# Patient Record
Sex: MALE | Race: WHITE | NOT HISPANIC OR LATINO | Employment: UNEMPLOYED | URBAN - METROPOLITAN AREA
[De-identification: names, ages, dates, MRNs, and addresses within clinical notes are randomized per-mention and may not be internally consistent; named-entity substitution may affect disease eponyms.]

---

## 2017-07-16 ENCOUNTER — HOSPITAL ENCOUNTER (OUTPATIENT)
Dept: URGENT CARE | Facility: CLINIC | Age: 22
Discharge: HOME | End: 2017-07-16
Attending: FAMILY MEDICINE | Admitting: FAMILY MEDICINE

## 2019-11-02 PROCEDURE — 87081 CULTURE SCREEN ONLY: CPT | Performed by: FAMILY MEDICINE

## 2024-02-10 ENCOUNTER — HOSPITAL ENCOUNTER (EMERGENCY)
Facility: HOSPITAL | Age: 29
Discharge: HOME | End: 2024-02-10
Attending: EMERGENCY MEDICINE
Payer: MEDICAID

## 2024-02-10 VITALS
SYSTOLIC BLOOD PRESSURE: 167 MMHG | TEMPERATURE: 97.6 F | BODY MASS INDEX: 24.87 KG/M2 | HEIGHT: 75 IN | OXYGEN SATURATION: 99 % | RESPIRATION RATE: 20 BRPM | HEART RATE: 65 BPM | WEIGHT: 200 LBS | DIASTOLIC BLOOD PRESSURE: 102 MMHG

## 2024-02-10 DIAGNOSIS — K08.89 PAIN, DENTAL: Primary | ICD-10-CM

## 2024-02-10 PROCEDURE — 96372 THER/PROPH/DIAG INJ SC/IM: CPT

## 2024-02-10 PROCEDURE — 99283 EMERGENCY DEPT VISIT LOW MDM: CPT

## 2024-02-10 PROCEDURE — 25010000002 KETOROLAC TROMETHAMINE PER 15 MG: Performed by: EMERGENCY MEDICINE

## 2024-02-10 RX ORDER — AMOXICILLIN 250 MG/1
500 CAPSULE ORAL ONCE
Status: COMPLETED | OUTPATIENT
Start: 2024-02-10 | End: 2024-02-10

## 2024-02-10 RX ORDER — CYCLOBENZAPRINE HCL 10 MG
10 TABLET ORAL
Qty: 7 TABLET | Refills: 0 | Status: SHIPPED | OUTPATIENT
Start: 2024-02-10

## 2024-02-10 RX ORDER — NAPROXEN 500 MG/1
500 TABLET ORAL 2 TIMES DAILY PRN
Qty: 20 TABLET | Refills: 0 | Status: SHIPPED | OUTPATIENT
Start: 2024-02-10

## 2024-02-10 RX ORDER — KETOROLAC TROMETHAMINE 30 MG/ML
60 INJECTION, SOLUTION INTRAMUSCULAR; INTRAVENOUS ONCE
Status: COMPLETED | OUTPATIENT
Start: 2024-02-10 | End: 2024-02-10

## 2024-02-10 RX ORDER — CYCLOBENZAPRINE HCL 10 MG
10 TABLET ORAL ONCE
Status: COMPLETED | OUTPATIENT
Start: 2024-02-10 | End: 2024-02-10

## 2024-02-10 RX ADMIN — KETOROLAC TROMETHAMINE 60 MG: 30 INJECTION, SOLUTION INTRAMUSCULAR; INTRAVENOUS at 00:47

## 2024-02-10 RX ADMIN — AMOXICILLIN 500 MG: 250 CAPSULE ORAL at 00:47

## 2024-02-10 RX ADMIN — CYCLOBENZAPRINE 10 MG: 10 TABLET, FILM COATED ORAL at 00:47

## 2024-02-10 NOTE — FSED PROVIDER NOTE
Subjective   History of Present Illness    28-year-old male presents emergency department with dental pain in the right upper mouth.  He has a exposed nerve and needs a tooth pulled.  He went to the dentist today they gave him antibiotic prescription but he has not gotten it filled yet.  He was taking Motrin with only minimal relief for his pain.  He states that he is unable to sleep and the throbbing pain has been getting significantly worse.  He tried Orajel as well as ibuprofen.  The only thing that helps is swishing cold water    Review of Systems    All systems negative except as otherwise mentioned in the HPI    Past Medical History:   Diagnosis Date    Difficulty walking 06/24/2023    Causes by stress fracture    Ingrown toenail     Stress fracture 06/24/2023       Allergies   Allergen Reactions    Medrol [Methylprednisolone] Other (See Comments)      Steroids - Visual problems    Septra [Sulfamethoxazole-Trimethoprim] Other (See Comments)     Feels like on fire       Past Surgical History:   Procedure Laterality Date    CHEST SURGERY  2017    TOENAIL EXCISION         Family History   Problem Relation Age of Onset    Hodgkin's lymphoma Father        Social History     Socioeconomic History    Marital status: Single   Tobacco Use    Smoking status: Never    Smokeless tobacco: Never   Vaping Use    Vaping Use: Never used   Substance and Sexual Activity    Alcohol use: No    Drug use: No    Sexual activity: Not Currently     Partners: Female     Birth control/protection: Condom           Objective   Physical Exam    General: Alert and oriented, conversant  Eye: PERRL, EOMI, nomal conjunctiva  HENT: Normocephalic, normal hearing, moist oral mucosa    Lungs: Nonlabored respiration, no wheezing  Heart: Normal Rate, no mumurs gallops or rubs  Abdomen: Soft, Non tender, no peritoneal signs    Musculoskeletal: Normal range of motion and strength, no tenderness or swelling  Skin: Warm and dry, no alarming  josey  Neurologic: Awake, responsive, moving all extremities, no focal deficits  Psychiatric:  Cooperative, appropriate mood and affect    Dental: There is a fractured canine tooth in the upper right mandible.  There is no fluctuance or abscess present    Procedures           ED Course                                           Medical Decision Making  Risk  Prescription drug management.    28-year-old male presents emergency department with dental pain.  He has multiple dental caries.  IM Toradol given.  As well as antibiotics first dose in the emergency department.  Also provided with Flexeril.    Final diagnoses:   None       ED Disposition  ED Disposition       ED Disposition   Discharge    Condition   Stable    Comment   --               [place] [id number] [place] [id number] [phone]  In 2 days  If symptoms worsen         Medication List        New Prescriptions      cyclobenzaprine 10 MG tablet  Commonly known as: FLEXERIL  Take 1 tablet by mouth every night at bedtime.     naproxen 500 MG tablet  Commonly known as: NAPROSYN  Take 1 tablet by mouth 2 (Two) Times a Day As Needed for Moderate Pain.               Where to Get Your Medications        These medications were sent to [facility]/pharmacy #[id number] - [place], [place] - [address] [place] [address] - [phone]  - [phone] [address] [place] [address]      Hours: 24-hours Phone: [phone]   cyclobenzaprine 10 MG tablet  naproxen 500 MG tablet

## 2024-05-14 ENCOUNTER — OFFICE VISIT (OUTPATIENT)
Dept: FAMILY MEDICINE CLINIC | Facility: CLINIC | Age: 29
End: 2024-05-14
Payer: COMMERCIAL

## 2024-05-14 VITALS
RESPIRATION RATE: 18 BRPM | SYSTOLIC BLOOD PRESSURE: 158 MMHG | BODY MASS INDEX: 30.36 KG/M2 | WEIGHT: 244.2 LBS | OXYGEN SATURATION: 98 % | TEMPERATURE: 98.2 F | HEIGHT: 75 IN | HEART RATE: 90 BPM | DIASTOLIC BLOOD PRESSURE: 110 MMHG

## 2024-05-14 DIAGNOSIS — R21 RASH: ICD-10-CM

## 2024-05-14 DIAGNOSIS — H61.23 EXCESSIVE CERUMEN IN BOTH EAR CANALS: ICD-10-CM

## 2024-05-14 DIAGNOSIS — F33.0 DEPRESSION, MAJOR, RECURRENT, MILD: ICD-10-CM

## 2024-05-14 DIAGNOSIS — I10 HYPERTENSION, UNSPECIFIED TYPE: Primary | ICD-10-CM

## 2024-05-14 DIAGNOSIS — Z76.89 ENCOUNTER TO ESTABLISH CARE: ICD-10-CM

## 2024-05-14 PROBLEM — L03.011 CELLULITIS OF FINGER OF RIGHT HAND: Status: RESOLVED | Noted: 2017-07-16 | Resolved: 2024-05-14

## 2024-05-14 PROBLEM — E78.5 DYSLIPIDEMIA: Status: ACTIVE | Noted: 2019-12-16

## 2024-05-14 PROBLEM — Z83.3 FAMILY HISTORY OF DIABETES MELLITUS: Status: RESOLVED | Noted: 2024-05-14 | Resolved: 2024-05-14

## 2024-05-14 PROBLEM — L60.0 INGROWN NAIL: Status: RESOLVED | Noted: 2018-07-12 | Resolved: 2024-05-14

## 2024-05-14 PROBLEM — N62 GYNECOMASTIA, MALE: Status: ACTIVE | Noted: 2017-07-27

## 2024-05-14 PROCEDURE — 99214 OFFICE O/P EST MOD 30 MIN: CPT | Performed by: NURSE PRACTITIONER

## 2024-05-14 RX ORDER — AMLODIPINE BESYLATE 10 MG/1
10 TABLET ORAL DAILY
Qty: 90 TABLET | Refills: 0 | Status: SHIPPED | OUTPATIENT
Start: 2024-05-14

## 2024-05-14 RX ORDER — CITALOPRAM HYDROBROMIDE 10 MG/1
10 TABLET ORAL DAILY
Qty: 90 TABLET | Refills: 0 | Status: SHIPPED | OUTPATIENT
Start: 2024-05-14

## 2024-05-14 NOTE — ASSESSMENT & PLAN NOTE
Hypertension is uncontrolled  Start amlodipine 10 mg half tab x 2 to 3 days, then increase to whole tab daily.  Medication changes per orders.  Weight loss.  Regular aerobic exercise.  Ambulatory blood pressure monitoring.  Blood pressure will be reassessedin 2 weeks.

## 2024-05-14 NOTE — PROGRESS NOTES
"Chief Complaint  Establish Care    Subjective        Buddy Cho presents to [facility]  History of Present Illness    Patient presents today to establish care.  Last PCP with [facility] provider; Rachele Chicas. Works as  at [employer] in [place]. Reported history eczema.  Current affected areas include bilateral arms, right hand, left leg and right leg.  Positive for intermittent itching and dry skin.  Negative for any bleeding and drainage.      Depression:   Year of onset: 9119-6183. Frequency of symptoms: daily. Status is worsening. Level of function somewhat difficult to very difficult. Recent medication included Celexa 10 mg daily (stopped taking 4 to 5 months ago-had no refills). Risk factors include: financial stress, relationship issues (girlfriend)-no problems while on SSRI.   Aggravating factors include stress.  Relieving factors include medication. Presenting symptoms mild anxiety, depressed mood, lack of interest, and worry. Pertinent negatives include difficulty concentrating, difficulty falling asleep, difficulty staying asleep, compulsive thoughts, decreased need for sleep, easily startled, fatigue, feelings of guilty, feelings of vulnerability, increased energy, hallucinations, change in libido, loss of appetite, paranoia, poor judgement, restlessness, thoughts of death or suicide.        Objective   Vital Signs:  BP (!) 158/110 (BP Location: Left arm, Patient Position: Sitting, Cuff Size: Adult)   Pulse 90   Temp 98.2 °F (36.8 °C)   Resp 18   Ht 190.5 cm (75\")   Wt 111 kg (244 lb 3.2 oz)   SpO2 98%   BMI 30.52 kg/m²   Estimated body mass index is 30.52 kg/m² as calculated from the following:    Height as of this encounter: 190.5 cm (75\").    Weight as of this encounter: 111 kg (244 lb 3.2 oz).             Physical Exam  Constitutional:       General: He is not in acute distress.     Appearance: He is obese.      Comments: Pleasant, converses " appropriately    HENT:      Head: Normocephalic and atraumatic.      Right Ear: Tympanic membrane and external ear normal.      Left Ear: Tympanic membrane and external ear normal.      Ears:      Comments: Excessive cerumen in bilateral ear canals     Nose: Nose normal.      Mouth/Throat:      Lips: Pink.      Mouth: Mucous membranes are moist.      Pharynx: Oropharynx is clear.   Eyes:      Conjunctiva/sclera: Conjunctivae normal.   Cardiovascular:      Rate and Rhythm: Normal rate and regular rhythm.      Chest Wall: PMI is not displaced.      Pulses: Normal pulses.      Heart sounds: Normal heart sounds, S1 normal and S2 normal.   Pulmonary:      Effort: Pulmonary effort is normal.      Breath sounds: Normal breath sounds.      Comments: Negative cough  Abdominal:      General: Bowel sounds are normal.      Palpations: Abdomen is soft.      Tenderness: There is no abdominal tenderness.   Musculoskeletal:         General: Normal range of motion.      Cervical back: Neck supple.      Right lower leg: No edema.      Left lower leg: No edema.   Lymphadenopathy:      Cervical: No cervical adenopathy.      Upper Body:      Right upper body: No supraclavicular adenopathy.      Left upper body: No supraclavicular adenopathy.   Skin:     General: Skin is warm and dry.      Findings: Rash present. Rash is crusting.      Comments: Scattered mildly erythematous patches with crusting located on bilateral arms, right hand.    Neurological:      Mental Status: He is alert and oriented to person, place, and time.      Gait: Gait is intact.   Psychiatric:         Attention and Perception: Attention normal.         Mood and Affect: Mood and affect normal.         Speech: Speech normal.         Behavior: Behavior normal.         Thought Content: Thought content normal.         Judgment: Judgment normal.        Result Review :                     Assessment and Plan     Diagnoses and all orders for this visit:    1. Hypertension,  unspecified type (Primary)  Assessment & Plan:  Hypertension is uncontrolled  Start amlodipine 10 mg half tab x 2 to 3 days, then increase to whole tab daily.  Medication changes per orders.  Weight loss.  Regular aerobic exercise.  Ambulatory blood pressure monitoring.  Blood pressure will be reassessedin 2 weeks.    Orders:  -     CBC & Differential; Future  -     Comprehensive Metabolic Panel; Future  -     Hemoglobin A1c; Future  -     Urinalysis With Culture If Indicated -; Future  -     TSH Rfx On Abnormal To Free T4; Future  -     Lipid Panel; Future  -     amLODIPine (NORVASC) 10 MG tablet; Take 1 tablet by mouth Daily.  Dispense: 90 tablet; Refill: 0    2. Depression, major, recurrent, mild  Assessment & Plan:  Restart Celexa 10 mg daily.  Follow-up in 2-3 weeks with update.     Orders:  -     citalopram (CeleXA) 10 MG tablet; Take 1 tablet by mouth Daily.  Dispense: 90 tablet; Refill: 0    3. Encounter to establish care  -     CBC & Differential; Future  -     Comprehensive Metabolic Panel; Future  -     Hemoglobin A1c; Future  -     Urinalysis With Culture If Indicated -; Future  -     TSH Rfx On Abnormal To Free T4; Future  -     Lipid Panel; Future    4. Rash  -     Ambulatory Referral to Dermatology    5. Excessive cerumen in both ear canals  Comments:  Recommended Debrox x 4 days prior to return for washout.             Follow Up     Return in about 2 weeks (around 5/28/2024) for follow up hypertension, follow up labs, and ear washout. .  Patient was given instructions and counseling regarding his condition or for health maintenance advice. Please see specific information pulled into the AVS if appropriate.

## 2024-06-18 ENCOUNTER — LAB (OUTPATIENT)
Dept: FAMILY MEDICINE CLINIC | Facility: CLINIC | Age: 29
End: 2024-06-18
Payer: COMMERCIAL

## 2024-06-18 DIAGNOSIS — I10 HYPERTENSION, UNSPECIFIED TYPE: ICD-10-CM

## 2024-06-18 DIAGNOSIS — Z76.89 ENCOUNTER TO ESTABLISH CARE: ICD-10-CM

## 2024-06-18 LAB — TSH SERPL DL<=0.05 MIU/L-ACNC: 0.57 UIU/ML (ref 0.27–4.2)

## 2024-06-18 PROCEDURE — 80061 LIPID PANEL: CPT | Performed by: NURSE PRACTITIONER

## 2024-06-18 PROCEDURE — 80050 GENERAL HEALTH PANEL: CPT | Performed by: NURSE PRACTITIONER

## 2024-06-18 PROCEDURE — 81003 URINALYSIS AUTO W/O SCOPE: CPT | Performed by: NURSE PRACTITIONER

## 2024-06-18 PROCEDURE — 36415 COLL VENOUS BLD VENIPUNCTURE: CPT

## 2024-06-18 PROCEDURE — 83036 HEMOGLOBIN GLYCOSYLATED A1C: CPT | Performed by: NURSE PRACTITIONER

## 2024-06-19 LAB
ALBUMIN SERPL-MCNC: 4.7 G/DL (ref 3.5–5.2)
ALBUMIN/GLOB SERPL: 1.7 G/DL
ALP SERPL-CCNC: 84 U/L (ref 39–117)
ALT SERPL W P-5'-P-CCNC: 41 U/L (ref 1–41)
ANION GAP SERPL CALCULATED.3IONS-SCNC: 9.6 MMOL/L (ref 5–15)
AST SERPL-CCNC: 23 U/L (ref 1–40)
BASOPHILS # BLD AUTO: 0.07 10*3/MM3 (ref 0–0.2)
BASOPHILS NFR BLD AUTO: 0.8 % (ref 0–1.5)
BILIRUB SERPL-MCNC: 0.5 MG/DL (ref 0–1.2)
BILIRUB UR QL STRIP: NEGATIVE
BUN SERPL-MCNC: 8 MG/DL (ref 6–20)
BUN/CREAT SERPL: 8 (ref 7–25)
CALCIUM SPEC-SCNC: 9.4 MG/DL (ref 8.6–10.5)
CHLORIDE SERPL-SCNC: 104 MMOL/L (ref 98–107)
CHOLEST SERPL-MCNC: 162 MG/DL (ref 0–200)
CLARITY UR: CLEAR
CO2 SERPL-SCNC: 27.4 MMOL/L (ref 22–29)
COLOR UR: YELLOW
CREAT SERPL-MCNC: 1 MG/DL (ref 0.76–1.27)
DEPRECATED RDW RBC AUTO: 41.8 FL (ref 37–54)
EGFRCR SERPLBLD CKD-EPI 2021: 104.5 ML/MIN/1.73
EOSINOPHIL # BLD AUTO: 0.15 10*3/MM3 (ref 0–0.4)
EOSINOPHIL NFR BLD AUTO: 1.7 % (ref 0.3–6.2)
ERYTHROCYTE [DISTWIDTH] IN BLOOD BY AUTOMATED COUNT: 12.4 % (ref 12.3–15.4)
GLOBULIN UR ELPH-MCNC: 2.8 GM/DL
GLUCOSE SERPL-MCNC: 93 MG/DL (ref 65–99)
GLUCOSE UR STRIP-MCNC: NEGATIVE MG/DL
HBA1C MFR BLD: 5.1 % (ref 4.8–5.6)
HCT VFR BLD AUTO: 45 % (ref 37.5–51)
HDLC SERPL-MCNC: 42 MG/DL (ref 40–60)
HGB BLD-MCNC: 15.3 G/DL (ref 13–17.7)
HGB UR QL STRIP.AUTO: NEGATIVE
HOLD SPECIMEN: NORMAL
IMM GRANULOCYTES # BLD AUTO: 0.02 10*3/MM3 (ref 0–0.05)
IMM GRANULOCYTES NFR BLD AUTO: 0.2 % (ref 0–0.5)
KETONES UR QL STRIP: NEGATIVE
LDLC SERPL CALC-MCNC: 95 MG/DL (ref 0–100)
LDLC/HDLC SERPL: 2.2 {RATIO}
LEUKOCYTE ESTERASE UR QL STRIP.AUTO: NEGATIVE
LYMPHOCYTES # BLD AUTO: 1.89 10*3/MM3 (ref 0.7–3.1)
LYMPHOCYTES NFR BLD AUTO: 20.8 % (ref 19.6–45.3)
MCH RBC QN AUTO: 31.4 PG (ref 26.6–33)
MCHC RBC AUTO-ENTMCNC: 34 G/DL (ref 31.5–35.7)
MCV RBC AUTO: 92.4 FL (ref 79–97)
MONOCYTES # BLD AUTO: 0.79 10*3/MM3 (ref 0.1–0.9)
MONOCYTES NFR BLD AUTO: 8.7 % (ref 5–12)
NEUTROPHILS NFR BLD AUTO: 6.17 10*3/MM3 (ref 1.7–7)
NEUTROPHILS NFR BLD AUTO: 67.8 % (ref 42.7–76)
NITRITE UR QL STRIP: NEGATIVE
NRBC BLD AUTO-RTO: 0 /100 WBC (ref 0–0.2)
PH UR STRIP.AUTO: 6.5 [PH] (ref 5–8)
PLATELET # BLD AUTO: 317 10*3/MM3 (ref 140–450)
PMV BLD AUTO: 11.5 FL (ref 6–12)
POTASSIUM SERPL-SCNC: 4.5 MMOL/L (ref 3.5–5.2)
PROT SERPL-MCNC: 7.5 G/DL (ref 6–8.5)
PROT UR QL STRIP: NEGATIVE
RBC # BLD AUTO: 4.87 10*6/MM3 (ref 4.14–5.8)
SODIUM SERPL-SCNC: 141 MMOL/L (ref 136–145)
SP GR UR STRIP: 1.02 (ref 1–1.03)
TRIGL SERPL-MCNC: 139 MG/DL (ref 0–150)
UROBILINOGEN UR QL STRIP: NORMAL
VLDLC SERPL-MCNC: 25 MG/DL (ref 5–40)
WBC NRBC COR # BLD AUTO: 9.09 10*3/MM3 (ref 3.4–10.8)

## 2024-06-19 NOTE — PROGRESS NOTES
CALLED PATIENT AND GAVE HIM HIS LAB RESULTS AND SCHEDULED HIM TO COME IN FOR HIS NEW PATIENT F/U VISIT FOR 30 MINUTES ON 06/20/2024

## 2024-06-20 ENCOUNTER — OFFICE VISIT (OUTPATIENT)
Dept: FAMILY MEDICINE CLINIC | Facility: CLINIC | Age: 29
End: 2024-06-20
Payer: COMMERCIAL

## 2024-06-20 VITALS
RESPIRATION RATE: 18 BRPM | HEIGHT: 75 IN | SYSTOLIC BLOOD PRESSURE: 125 MMHG | DIASTOLIC BLOOD PRESSURE: 86 MMHG | OXYGEN SATURATION: 96 % | WEIGHT: 245 LBS | TEMPERATURE: 98.6 F | BODY MASS INDEX: 30.46 KG/M2 | HEART RATE: 82 BPM

## 2024-06-20 DIAGNOSIS — I10 PRIMARY HYPERTENSION: Primary | ICD-10-CM

## 2024-06-20 DIAGNOSIS — F33.0 DEPRESSION, MAJOR, RECURRENT, MILD: ICD-10-CM

## 2024-06-20 PROCEDURE — 99214 OFFICE O/P EST MOD 30 MIN: CPT | Performed by: NURSE PRACTITIONER

## 2024-06-20 NOTE — PROGRESS NOTES
"Chief Complaint  Follow-up    Subjective        Buddy Cho presents to [facility]  History of Present Illness    Patient presents today for follow-up on new patient visit/labs, hypertension, and med update with depression. Visit with dermatology provider at Dr. Cruz's office; started hydrocortisone 5%. Told eczema vs psoriasis; planning biopsy if no improvement.      Hypertension: Status is improved.  Current medication includes amlodipine 10 mg daily. Staying active [id number] steps per day.  Negative for headache, chest pain, dyspnea, diaphoresis, palpitations, dizziness, and visual disturbance.    Depression: Status is improved 70-80%. Current medication includes Celexa 10 mg daily. Symptoms which have improved include mild anxiety, depressed mood, lack of interest and worry.  Negative for any suicidal or homicidal ideations.          Objective   Vital Signs:  /86 (BP Location: Right arm, Patient Position: Sitting, Cuff Size: Adult)   Pulse 82   Temp 98.6 °F (37 °C)   Resp 18   Ht 190.5 cm (75\")   Wt 111 kg (245 lb)   SpO2 96%   BMI 30.62 kg/m²   Estimated body mass index is 30.62 kg/m² as calculated from the following:    Height as of this encounter: 190.5 cm (75\").    Weight as of this encounter: 111 kg (245 lb).       BMI is >= 30 and <35. (Class 1 Obesity). The following options were offered after discussion;: exercise counseling/recommendations and nutrition counseling/recommendations      Physical Exam  Constitutional:       General: He is not in acute distress.     Appearance: He is obese.   HENT:      Head: Normocephalic.      Right Ear: External ear normal.      Left Ear: External ear normal.      Nose: Nose normal.   Eyes:      Conjunctiva/sclera: Conjunctivae normal.   Cardiovascular:      Rate and Rhythm: Normal rate and regular rhythm.      Chest Wall: PMI is not displaced.      Heart sounds: Normal heart sounds.   Pulmonary:      Effort: Pulmonary " effort is normal.      Breath sounds: Normal breath sounds and air entry.   Musculoskeletal:      Cervical back: Neck supple.      Right lower leg: No edema.      Left lower leg: No edema.   Skin:     General: Skin is warm and dry.   Neurological:      Mental Status: He is alert and oriented to person, place, and time.      Gait: Gait is intact.   Psychiatric:         Attention and Perception: Attention normal.         Mood and Affect: Mood normal.         Speech: Speech normal.         Behavior: Behavior normal.         Thought Content: Thought content normal. Thought content does not include homicidal or suicidal ideation.         Judgment: Judgment normal.        Result Review :      CMP          6/18/2024    10:57   CMP   Glucose 93    BUN 8    Creatinine 1.00    EGFR 104.5    Sodium 141    Potassium 4.5    Chloride 104    Calcium 9.4    Total Protein 7.5    Albumin 4.7    Globulin 2.8    Total Bilirubin 0.5    Alkaline Phosphatase 84    AST (SGOT) 23    ALT (SGPT) 41    Albumin/Globulin Ratio 1.7    BUN/Creatinine Ratio 8.0    Anion Gap 9.6      CBC w/diff          6/18/2024    10:57   CBC w/Diff   WBC 9.09    RBC 4.87    Hemoglobin 15.3    Hematocrit 45.0    MCV 92.4    MCH 31.4    MCHC 34.0    RDW 12.4    Platelets 317    Neutrophil Rel % 67.8    Immature Granulocyte Rel % 0.2    Lymphocyte Rel % 20.8    Monocyte Rel % 8.7    Eosinophil Rel % 1.7    Basophil Rel % 0.8      Lipid Panel          6/18/2024    10:57   Lipid Panel   Total Cholesterol 162    Triglycerides 139    HDL Cholesterol 42    VLDL Cholesterol 25    LDL Cholesterol  95    LDL/HDL Ratio 2.20      TSH          6/18/2024    10:57   TSH   TSH 0.572      Most Recent A1C          6/18/2024    10:57   HGBA1C Most Recent   Hemoglobin A1C 5.10        UA          6/18/2024    10:57   Urinalysis   Specific [place], UA 1.020    Ketones, UA Negative    Blood, UA Negative    Leukocytes, UA Negative    Nitrite, UA Negative                     Assessment  and Plan     Diagnoses and all orders for this visit:    1. Primary hypertension (Primary)  Assessment & Plan:  Hypertension is stable and controlled  Continue amlodipine 10 mg daily.  Discussed blood pressure goal of less than 130/80.  Continue current treatment regimen.  Weight loss.  Regular aerobic exercise.  Ambulatory blood pressure monitoring.  Blood pressure will be reassessed in 3 months.      2. Depression, major, recurrent, mild  Assessment & Plan:  Continue Celexa 10 mg daily.  Follow-up in 3 months.               Follow Up     Return in about 3 months (around 9/20/2024) for follow up depression and hypertension. .  Patient was given instructions and counseling regarding his condition or for health maintenance advice. Please see specific information pulled into the AVS if appropriate.         Answers submitted by the patient for this visit:  Primary Reason for Visit (Submitted on 6/20/2024)  What is the primary reason for your visit?: Other  Other (Submitted on 6/20/2024)  Please describe your symptoms.: Follow up for blood work  Have you had these symptoms before?: No  How long have you been having these symptoms?: 1-4 days

## 2024-06-20 NOTE — ASSESSMENT & PLAN NOTE
Hypertension is stable and controlled  Continue amlodipine 10 mg daily.  Discussed blood pressure goal of less than 130/80.  Continue current treatment regimen.  Weight loss.  Regular aerobic exercise.  Ambulatory blood pressure monitoring.  Blood pressure will be reassessed in 3 months.

## 2024-08-13 DIAGNOSIS — I10 HYPERTENSION, UNSPECIFIED TYPE: ICD-10-CM

## 2024-08-13 RX ORDER — AMLODIPINE BESYLATE 10 MG/1
10 TABLET ORAL DAILY
Qty: 90 TABLET | Refills: 0 | Status: SHIPPED | OUTPATIENT
Start: 2024-08-13

## 2024-09-06 DIAGNOSIS — F33.0 DEPRESSION, MAJOR, RECURRENT, MILD: ICD-10-CM

## 2024-09-09 RX ORDER — CITALOPRAM HYDROBROMIDE 10 MG/1
10 TABLET ORAL DAILY
Qty: 90 TABLET | Refills: 0 | Status: SHIPPED | OUTPATIENT
Start: 2024-09-09

## 2024-09-13 ENCOUNTER — OFFICE VISIT (OUTPATIENT)
Dept: PODIATRY | Facility: CLINIC | Age: 29
End: 2024-09-13
Payer: COMMERCIAL

## 2024-09-13 VITALS
WEIGHT: 232 LBS | BODY MASS INDEX: 28.85 KG/M2 | HEIGHT: 75 IN | OXYGEN SATURATION: 98 % | HEART RATE: 75 BPM | RESPIRATION RATE: 20 BRPM

## 2024-09-13 DIAGNOSIS — M77.42 METATARSALGIA OF LEFT FOOT: ICD-10-CM

## 2024-09-13 DIAGNOSIS — M79.672 PAIN IN LEFT FOOT: Primary | ICD-10-CM

## 2024-09-13 DIAGNOSIS — L74.8 FOOT ODOR: ICD-10-CM

## 2024-09-13 DIAGNOSIS — B35.1 ONYCHOMYCOSIS: ICD-10-CM

## 2024-09-13 DIAGNOSIS — L85.2 PUNCTATE KERATOSIS: ICD-10-CM

## 2024-09-15 NOTE — PROGRESS NOTES
09/13/2024  Foot and Ankle Surgery - Established Patient/Follow-up  Provider: JOHANNA Rivers   Location: [facility]    Subjective:  Buddy Cho is a 29 y.o. male.     Chief Complaint   Patient presents with    Left Foot - Initial Evaluation, Pain, Ingrown Toenail     Lateral side of foot pain. Ingrown toenail    Initial Evaluation     CHASE Godinez last pcp visit 07/15/2024       History of Present Illness  The patient is a 29-year-old male who is here today regarding problems with left foot pain and toenail concerns.    Approximately a year ago, he consulted Dr. Faria due to left foot pain. However, he reports that his current pain is distinct. He recalls a previous injury in the same area, which resulted in a stress fracture. The toenail has appeared discolored, thickened, and fungal. He suspects a fungal infection on his left big toe, which has been progressively worsening. He frequently taps his toe, which causes significant pain. He reports no incidents of falling or the toenail dropping off. The toenail pain was not severe initially, but it has since become painful. He is considering permanent removal of the toenail. He does not have diabetes.    He recalls an ingrown toenail on his right foot, which was recurrent and eventually removed. He sought treatment from another podiatrist 3 to 4 years ago, who prescribed a cream. However, the condition did not improve.    He is also seeking recommendations for severe foot odor.       Allergies   Allergen Reactions    Medrol [Methylprednisolone] Other (See Comments)      Steroids - Visual problems    Septra [Sulfamethoxazole-Trimethoprim] Other (See Comments)     Feels like on fire       Current Outpatient Medications on File Prior to Visit   Medication Sig Dispense Refill    amLODIPine (NORVASC) 10 MG tablet TAKE 1 TABLET BY MOUTH EVERY DAY 90 tablet 0    citalopram (CeleXA) 10 MG tablet TAKE 1 TABLET BY MOUTH EVERY DAY 90 tablet 0     No current  "facility-administered medications on file prior to visit.       Objective   Pulse 75   Resp 20   Ht 190.5 cm (75\")   Wt 105 kg (232 lb)   SpO2 98%   BMI 29.00 kg/m²     Foot/Ankle Exam    GENERAL  Appearance:  appears stated age  Orientation:  AAOx3  Affect:  appropriate  Gait:  unimpaired  Assistance:  independent  Right shoe gear: casual shoe and sock  Left shoe gear: casual shoe and sock    VASCULAR     Left Foot Vascularity   Dorsalis pedis:  2+  Skin temperature:  warm  Edema grading:  None  CFT:  < 3 seconds  Pedal hair growth:  Present  Varicosities:  none     NEUROLOGIC     Left Foot Neurologic   Light touch sensation: normal    MUSCULOSKELETAL     Left Foot Musculoskeletal   Ecchymosis:  none  Tenderness:  callous left foot and toenail problem    DERMATOLOGIC        Left Foot Dermatologic   Skin  Left foot skin is intact.   Nails  1.  Positive for onychomycosis, abnormal thickness and dystrophic nail.  Physical Exam         Results  Imaging  X-ray of the left foot shows no stress fracture or broken bones.     Assessment & Plan   Diagnoses and all orders for this visit:    1. Pain in left foot (Primary)  -     XR Foot 3+ View Left    2. Onychomycosis    3. Metatarsalgia of left foot    4. Punctate keratosis      Assessment & Plan  1. Toenail fungus.  The toenail appears discolored and thickened, indicative of a fungal infection. He reports that the toenail pain has worsened over time. Permanent removal of the toenail was recommended to alleviate pain and prevent recurrence. The procedure involves numbing the toe with lidocaine injections, removing the toenail, and using an acid to prevent regrowth. He prefers to schedule the procedure for a later date to accommodate his plans.     2. Left foot pain.  He reports pain on the side of his left foot, which is exacerbated by pressure. Examination of left foot xray revealed no signs of stress fractures or broken bones. A callus/ punktate keratosis buildup was " observed and trimmed down with curette x1 . Over-the-counter arch supports with a metatarsal pad were recommended to redistribute pressure on the forefoot and reduce pain. Gentle exfoliation with a pumice stone was advised to prevent recurrence of the callus.    3. foot odor.  He inquired about recommendations for severe foot odor. Discussed pathophysiology of foot odor and recommend wash feet daily and change socks daily or twice daily. Advised trying OTC antiperspirant spray daily to feet.     Follow-up  He will follow up at his convenience for the toenail removal.       Orders Placed This Encounter   Procedures    XR Foot 3+ View Left     Order Specific Question:   Reason for Exam:     Answer:   left foot pain x 2 months. previous fracture to left foot. room 14.      Order Specific Question:   Does this patient have a diabetic monitoring/medication delivering device on?     Answer:   No     Order Specific Question:   Release to patient     Answer:   Routine Release [[id number]]          Patient or patient representative verbalized consent for the use of Ambient Listening during the visit with  JOHANNA Xiong for chart documentation. 9/15/2024  10:43 EDT

## 2024-11-16 DIAGNOSIS — I10 HYPERTENSION, UNSPECIFIED TYPE: ICD-10-CM

## 2024-11-18 RX ORDER — AMLODIPINE BESYLATE 10 MG/1
10 TABLET ORAL DAILY
Qty: 90 TABLET | Refills: 0 | Status: SHIPPED | OUTPATIENT
Start: 2024-11-18

## 2024-11-22 ENCOUNTER — OFFICE VISIT (OUTPATIENT)
Dept: FAMILY MEDICINE CLINIC | Facility: CLINIC | Age: 29
End: 2024-11-22
Payer: COMMERCIAL

## 2024-11-22 VITALS
OXYGEN SATURATION: 97 % | SYSTOLIC BLOOD PRESSURE: 130 MMHG | DIASTOLIC BLOOD PRESSURE: 92 MMHG | WEIGHT: 236.7 LBS | TEMPERATURE: 97.5 F | HEIGHT: 75 IN | HEART RATE: 97 BPM | RESPIRATION RATE: 18 BRPM | BODY MASS INDEX: 29.43 KG/M2

## 2024-11-22 DIAGNOSIS — I10 PRIMARY HYPERTENSION: Primary | ICD-10-CM

## 2024-11-22 DIAGNOSIS — F33.0 DEPRESSION, MAJOR, RECURRENT, MILD: ICD-10-CM

## 2024-11-22 DIAGNOSIS — M25.531 RIGHT WRIST PAIN: ICD-10-CM

## 2024-11-22 DIAGNOSIS — M77.8 RIGHT WRIST TENDINITIS: ICD-10-CM

## 2024-11-22 PROCEDURE — 99214 OFFICE O/P EST MOD 30 MIN: CPT | Performed by: NURSE PRACTITIONER

## 2024-11-22 RX ORDER — CITALOPRAM HYDROBROMIDE 10 MG/1
10 TABLET ORAL DAILY
Qty: 90 TABLET | Refills: 1 | Status: SHIPPED | OUTPATIENT
Start: 2024-11-22

## 2024-11-22 RX ORDER — PREDNISONE 20 MG/1
TABLET ORAL
Qty: 14 TABLET | Refills: 0 | Status: SHIPPED | OUTPATIENT
Start: 2024-11-22

## 2024-11-22 NOTE — PROGRESS NOTES
"Chief Complaint  Primary Care Follow-Up (Wants wrist looked at. )    Subjective        Buddy Cho presents to [facility]  History of Present Illness    Patient presents today to follow-up on hypertension and depression.  Would also like to discuss right wrist pain.    Hypertension: Status is stable.  Current medication includes amlodipine 10 mg daily.  Negative for headache, chest pain, dizziness, diaphoresis, palpitations, nausea, vomiting, visual disturbance, and lower extremity swelling.    Depression: Status is stable.  Current medication includes Celexa 10 mg daily.  Negative for anxiety, depressed mood, lack of interest, excess worry, suicidal or homicidal ideations.      Right wrist pain:  Onset of symptoms 2 weeks ago. Gradual. Pain scale 3/10-10/10. Severity of symptoms are mild to severe.  Affected side right. Status is worse. Frequency is persistent. Dominant hand right.  Negative for decreased  strength, numbness in fingers, numbness in hand, pain in finger, pain in hand, sense of hand weakness, tingling sensation in finger, tingling sensation in palm. Radiation -none.  No injury. 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 Risk factors include high risk employment. Relieved by frequent breaks from repetitious activity, NSAIDS, and wrist splinting.  Aggravated by forceful work, repetitive activity, sustained hand position, and writing.  Associated symptoms include tingling in the wrist, numbness in wrist, wrist tenderness, and decreased mobility.  Pertinent negatives include fever, crepitus, difficulty with grasping, difficulty sleeping, spasms, tremors, weakness, wrist joint swelling, wrist locking.         Objective   Vital Signs:  /92 (BP Location: Left arm, Patient Position: Sitting, Cuff Size: Adult)   Pulse 97   Temp 97.5 °F (36.4 °C) (Temporal)   Resp 18   Ht 190.5 cm (75\")   Wt 107 " "kg (236 lb 11.2 oz)   SpO2 97%   BMI 29.59 kg/m²   Estimated body mass index is 29.59 kg/m² as calculated from the following:    Height as of this encounter: 190.5 cm (75\").    Weight as of this encounter: 107 kg (236 lb 11.2 oz).            Physical Exam  Constitutional:       General: He is not in acute distress.  Cardiovascular:      Rate and Rhythm: Normal rate and regular rhythm.      Heart sounds: Normal heart sounds, S1 normal and S2 normal.   Pulmonary:      Effort: Pulmonary effort is normal.      Breath sounds: Normal breath sounds and air entry.   Musculoskeletal:      Right wrist: Tenderness present. No swelling, effusion, bony tenderness, snuff box tenderness or crepitus. Decreased range of motion. Normal pulse.      Right lower leg: No edema.      Left lower leg: No edema.      Comments: Tenderness over right radial area.  Right wrist with decreased extension.   Skin:     General: Skin is warm and dry.   Neurological:      Mental Status: He is alert and oriented to person, place, and time.      Gait: Gait is intact.   Psychiatric:         Mood and Affect: Mood normal.         Thought Content: Thought content normal.         Judgment: Judgment normal.        Result Review :    CMP          6/18/2024    10:57   CMP   Glucose 93    BUN 8    Creatinine 1.00    EGFR 104.5    Sodium 141    Potassium 4.5    Chloride 104    Calcium 9.4    Total Protein 7.5    Albumin 4.7    Globulin 2.8    Total Bilirubin 0.5    Alkaline Phosphatase 84    AST (SGOT) 23    ALT (SGPT) 41    Albumin/Globulin Ratio 1.7    BUN/Creatinine Ratio 8.0    Anion Gap 9.6      CBC w/diff          6/18/2024    10:57   CBC w/Diff   WBC 9.09    RBC 4.87    Hemoglobin 15.3    Hematocrit 45.0    MCV 92.4    MCH 31.4    MCHC 34.0    RDW 12.4    Platelets 317    Neutrophil Rel % 67.8    Immature Granulocyte Rel % 0.2    Lymphocyte Rel % 20.8    Monocyte Rel % 8.7    Eosinophil Rel % 1.7    Basophil Rel % 0.8      Lipid Panel          6/18/2024 "    10:57   Lipid Panel   Total Cholesterol 162    Triglycerides 139    HDL Cholesterol 42    VLDL Cholesterol 25    LDL Cholesterol  95    LDL/HDL Ratio 2.20      TSH          6/18/2024    10:57   TSH   TSH 0.572      Most Recent A1C          6/18/2024    10:57   HGBA1C Most Recent   Hemoglobin A1C 5.10                Assessment and Plan   Diagnoses and all orders for this visit:    1. Primary hypertension (Primary)  Assessment & Plan:  Continue amlodipine 10 mg daily.  Discussed blood pressure likely elevated from current wrist pain.  Encouraged monitoring home blood pressure; return if greater than 130/80.  Follow-up in 6 months with annual exam.      2. Depression, major, recurrent, mild  Assessment & Plan:  Patient's depression is a recurrent episode that is mild without psychosis. Depression is in full remission and stable.    Plan:   Continue current medication therapy   Continue Celexa 10 mg daily.    Followup in 6 months.     Orders:  -     citalopram (CeleXA) 10 MG tablet; Take 1 tablet by mouth Daily.  Dispense: 90 tablet; Refill: 1    3. Right wrist tendinitis  Assessment & Plan:  Start prednisone taper as directed.  Recommended right wrist brace which keeps wrist straight and allows free movement of fingers.  Recommended ice for 20 minutes 2-3 times daily.  Avoid repetitive motion if possible.  Follow-up with hand surgery.       Orders:  -     Ambulatory Referral to Hand Surgery  -     predniSONE (DELTASONE) 20 MG tablet; Take 2 tabs daily x 5 days, 1 tab daily x 3 days, 1/2 tab daily x 2 days  Dispense: 14 tablet; Refill: 0    4. Right wrist pain  -     Ambulatory Referral to Hand Surgery  -     predniSONE (DELTASONE) 20 MG tablet; Take 2 tabs daily x 5 days, 1 tab daily x 3 days, 1/2 tab daily x 2 days  Dispense: 14 tablet; Refill: 0             Follow Up   Return in about 6 months (around 5/22/2025) for Annual physical.  Patient was given instructions and counseling regarding his condition or for  health maintenance advice. Please see specific information pulled into the AVS if appropriate.

## 2024-11-23 NOTE — ASSESSMENT & PLAN NOTE
Patient's depression is a recurrent episode that is mild without psychosis. Depression is in full remission and stable.    Plan:   Continue current medication therapy   Continue Celexa 10 mg daily.    Followup in 6 months.

## 2024-11-23 NOTE — ASSESSMENT & PLAN NOTE
Start prednisone taper as directed.  Recommended right wrist brace which keeps wrist straight and allows free movement of fingers.  Recommended ice for 20 minutes 2-3 times daily.  Avoid repetitive motion if possible.  Follow-up with hand surgery.

## 2024-11-23 NOTE — ASSESSMENT & PLAN NOTE
Continue amlodipine 10 mg daily.  Discussed blood pressure likely elevated from current wrist pain.  Encouraged monitoring home blood pressure; return if greater than 130/80.  Follow-up in 6 months with annual exam.

## 2025-02-12 DIAGNOSIS — I10 HYPERTENSION, UNSPECIFIED TYPE: ICD-10-CM

## 2025-02-12 RX ORDER — AMLODIPINE BESYLATE 10 MG/1
10 TABLET ORAL DAILY
Qty: 90 TABLET | Refills: 0 | Status: SHIPPED | OUTPATIENT
Start: 2025-02-12

## 2025-05-21 DIAGNOSIS — F33.0 DEPRESSION, MAJOR, RECURRENT, MILD: ICD-10-CM

## 2025-05-21 RX ORDER — CITALOPRAM HYDROBROMIDE 10 MG/1
10 TABLET ORAL DAILY
Qty: 90 TABLET | Refills: 1 | Status: SHIPPED | OUTPATIENT
Start: 2025-05-21

## 2025-05-22 DIAGNOSIS — I10 HYPERTENSION, UNSPECIFIED TYPE: ICD-10-CM

## 2025-05-22 RX ORDER — AMLODIPINE BESYLATE 10 MG/1
10 TABLET ORAL DAILY
Qty: 90 TABLET | Refills: 0 | Status: SHIPPED | OUTPATIENT
Start: 2025-05-22

## 2025-06-10 DIAGNOSIS — F33.0 DEPRESSION, MAJOR, RECURRENT, MILD: ICD-10-CM

## 2025-06-10 RX ORDER — CITALOPRAM HYDROBROMIDE 10 MG/1
10 TABLET ORAL DAILY
Qty: 90 TABLET | Refills: 1 | OUTPATIENT
Start: 2025-06-10